# Patient Record
Sex: MALE | Race: WHITE | NOT HISPANIC OR LATINO
[De-identification: names, ages, dates, MRNs, and addresses within clinical notes are randomized per-mention and may not be internally consistent; named-entity substitution may affect disease eponyms.]

---

## 2022-05-11 PROBLEM — Z00.00 ENCOUNTER FOR PREVENTIVE HEALTH EXAMINATION: Status: ACTIVE | Noted: 2022-05-11

## 2022-05-12 ENCOUNTER — APPOINTMENT (OUTPATIENT)
Dept: DERMATOLOGY | Facility: CLINIC | Age: 77
End: 2022-05-12
Payer: MEDICARE

## 2022-05-12 DIAGNOSIS — R21 RASH AND OTHER NONSPECIFIC SKIN ERUPTION: ICD-10-CM

## 2022-05-12 PROCEDURE — 99202 OFFICE O/P NEW SF 15 MIN: CPT

## 2022-05-12 RX ORDER — METOPROLOL TARTRATE 50 MG/1
50 TABLET, FILM COATED ORAL
Refills: 0 | Status: ACTIVE | COMMUNITY

## 2022-05-12 RX ORDER — ROSUVASTATIN CALCIUM 20 MG/1
20 TABLET, FILM COATED ORAL
Refills: 0 | Status: ACTIVE | COMMUNITY

## 2022-05-12 RX ORDER — ASPIRIN ENTERIC COATED TABLETS 81 MG 81 MG/1
81 TABLET, DELAYED RELEASE ORAL
Refills: 0 | Status: ACTIVE | COMMUNITY

## 2022-05-12 RX ORDER — LISINOPRIL 10 MG/1
10 TABLET ORAL
Refills: 0 | Status: ACTIVE | COMMUNITY

## 2022-05-12 RX ORDER — SENNOSIDES 8.6 MG
TABLET ORAL
Refills: 0 | Status: ACTIVE | COMMUNITY

## 2022-05-12 NOTE — HISTORY OF PRESENT ILLNESS
[FreeTextEntry1] : Rash on arms and leg [de-identified] : First visit for this 77-year-old white male with a two-month history of asymptomatic "spots" on the arms and legs.  No previous treatment. No previous episodes.\par Patient states the spots on the arms are starting to fade away.\par \par Note: Rash appeared while patient was being treated with a three-week tapering course of methylprednisolone starting at 24 mg a day for pain in the balls of both feet.  Pain persists. Patient no longer on the methylprednisolone

## 2022-05-12 NOTE — PHYSICAL EXAM
[Alert] : alert [Oriented x 3] : ~L oriented x 3 [Well Nourished] : well nourished [FreeTextEntry3] : Type II skin\par \par Patient wearing a facemask\par \par Forearms: No rash seen\par Legs: Multiple 8-10 mm dull erythematous patches, several with peripheral desquamating borders\par \par Moderate diffuse xerosis present on both legs

## 2023-06-19 ENCOUNTER — RX ONLY (RX ONLY)
Age: 78
End: 2023-06-19

## 2023-07-12 ENCOUNTER — OFFICE (OUTPATIENT)
Facility: LOCATION | Age: 78
Setting detail: OPHTHALMOLOGY
End: 2023-07-12
Payer: MEDICARE

## 2023-07-12 DIAGNOSIS — H26.493: ICD-10-CM

## 2023-07-12 PROBLEM — H35.40 PERIPAPILLARY ATROPHY ; BOTH EYES: Status: ACTIVE | Noted: 2023-07-12

## 2023-07-12 PROBLEM — H16.223 DRY EYE SYNDROME K SICCA; BOTH EYES: Status: ACTIVE | Noted: 2023-07-12

## 2023-07-12 PROBLEM — D31.02 NEVUS,CONJUNCTIVAL; LEFT EYE: Status: ACTIVE | Noted: 2023-07-12

## 2023-07-12 PROBLEM — H18.513 ENDOTHELIAL CORNEAL DYSTROPHY; BOTH EYES: Status: ACTIVE | Noted: 2023-07-12

## 2023-07-12 PROCEDURE — 99024 POSTOP FOLLOW-UP VISIT: CPT | Performed by: OPHTHALMOLOGY

## 2023-07-12 ASSESSMENT — TONOMETRY
OD_IOP_MMHG: 14
OS_IOP_MMHG: 13

## 2023-07-12 ASSESSMENT — SUPERFICIAL PUNCTATE KERATITIS (SPK)
OS_SPK: T
OD_SPK: T

## 2023-07-12 ASSESSMENT — CORNEAL DYSTROPHY - POSTERIOR
OD_POSTERIORDYSTROPHY: GUTTATA
OS_POSTERIORDYSTROPHY: GUTTATA

## 2023-07-28 PROBLEM — Z96.1 PSEUDOPHAKIA ; BOTH EYES: Status: ACTIVE | Noted: 2023-07-12

## 2023-07-28 ASSESSMENT — VISUAL ACUITY
OD_BCVA: 20/30-2
OS_BCVA: 20/30-1

## 2023-09-13 ENCOUNTER — OFFICE (OUTPATIENT)
Facility: LOCATION | Age: 78
Setting detail: OPHTHALMOLOGY
End: 2023-09-13
Payer: MEDICARE

## 2023-09-13 DIAGNOSIS — H18.513: ICD-10-CM

## 2023-09-13 DIAGNOSIS — H16.223: ICD-10-CM

## 2023-09-13 PROCEDURE — 68761 CLOSE TEAR DUCT OPENING: CPT | Performed by: OPHTHALMOLOGY

## 2023-09-13 PROCEDURE — 99213 OFFICE O/P EST LOW 20 MIN: CPT | Performed by: OPHTHALMOLOGY

## 2023-09-13 ASSESSMENT — SUPERFICIAL PUNCTATE KERATITIS (SPK)
OS_SPK: T
OD_SPK: T

## 2023-09-13 ASSESSMENT — CONFRONTATIONAL VISUAL FIELD TEST (CVF)
OD_FINDINGS: FULL
OS_FINDINGS: FULL

## 2023-09-13 ASSESSMENT — CORNEAL DYSTROPHY - POSTERIOR
OD_POSTERIORDYSTROPHY: GUTTATA
OS_POSTERIORDYSTROPHY: GUTTATA

## 2023-09-19 ASSESSMENT — VISUAL ACUITY
OD_BCVA: 20/30-1
OS_BCVA: 20/40+2

## 2024-01-30 ENCOUNTER — OFFICE (OUTPATIENT)
Facility: LOCATION | Age: 79
Setting detail: OPHTHALMOLOGY
End: 2024-01-30
Payer: MEDICARE

## 2024-01-30 DIAGNOSIS — D31.32: ICD-10-CM

## 2024-01-30 DIAGNOSIS — H31.29: ICD-10-CM

## 2024-01-30 DIAGNOSIS — H16.223: ICD-10-CM

## 2024-01-30 DIAGNOSIS — H35.443: ICD-10-CM

## 2024-01-30 PROBLEM — H43.811 VITREOUS DETACHMENT; RIGHT EYE: Status: ACTIVE | Noted: 2024-01-30

## 2024-01-30 PROCEDURE — 92014 COMPRE OPH EXAM EST PT 1/>: CPT | Performed by: OPHTHALMOLOGY

## 2024-01-30 PROCEDURE — 92250 FUNDUS PHOTOGRAPHY W/I&R: CPT | Performed by: OPHTHALMOLOGY

## 2024-01-30 ASSESSMENT — CONFRONTATIONAL VISUAL FIELD TEST (CVF)
OS_FINDINGS: FULL
OD_FINDINGS: FULL

## 2024-01-30 ASSESSMENT — CORNEAL DYSTROPHY - POSTERIOR
OD_POSTERIORDYSTROPHY: GUTTATA
OS_POSTERIORDYSTROPHY: GUTTATA

## 2024-01-30 ASSESSMENT — SUPERFICIAL PUNCTATE KERATITIS (SPK)
OD_SPK: T
OS_SPK: T

## 2024-01-31 ASSESSMENT — REFRACTION_AUTOREFRACTION
OD_CYLINDER: +0.75
OS_AXIS: 172
OS_SPHERE: PLANO
OD_AXIS: 5
OS_CYLINDER: +2.00
OD_SPHERE: PLANO

## 2024-07-30 ENCOUNTER — OFFICE (OUTPATIENT)
Facility: LOCATION | Age: 79
Setting detail: OPHTHALMOLOGY
End: 2024-07-30
Payer: MEDICARE

## 2024-07-30 DIAGNOSIS — H43.811: ICD-10-CM

## 2024-07-30 DIAGNOSIS — H43.812: ICD-10-CM

## 2024-07-30 DIAGNOSIS — D31.32: ICD-10-CM

## 2024-07-30 DIAGNOSIS — H16.223: ICD-10-CM

## 2024-07-30 PROBLEM — H43.393 VITREOUS FLOATERS; BOTH EYES: Status: ACTIVE | Noted: 2024-07-30

## 2024-07-30 PROCEDURE — 92250 FUNDUS PHOTOGRAPHY W/I&R: CPT | Performed by: OPHTHALMOLOGY

## 2024-07-30 PROCEDURE — 92014 COMPRE OPH EXAM EST PT 1/>: CPT | Performed by: OPHTHALMOLOGY

## 2024-07-30 ASSESSMENT — CONFRONTATIONAL VISUAL FIELD TEST (CVF)
OD_FINDINGS: FULL
OS_FINDINGS: FULL

## 2024-07-31 PROBLEM — H43.812 VITREOUS DETACHMENT; RIGHT EYE, LEFT EYE: Status: ACTIVE | Noted: 2024-07-30

## 2024-07-31 PROBLEM — H43.811 VITREOUS DETACHMENT; RIGHT EYE, LEFT EYE: Status: ACTIVE | Noted: 2024-07-30

## 2025-02-03 ENCOUNTER — OFFICE (OUTPATIENT)
Facility: LOCATION | Age: 80
Setting detail: OPHTHALMOLOGY
End: 2025-02-03
Payer: MEDICARE

## 2025-02-03 DIAGNOSIS — D31.32: ICD-10-CM

## 2025-02-03 DIAGNOSIS — H43.813: ICD-10-CM

## 2025-02-03 DIAGNOSIS — H33.102: ICD-10-CM

## 2025-02-03 DIAGNOSIS — H16.223: ICD-10-CM

## 2025-02-03 PROCEDURE — 92250 FUNDUS PHOTOGRAPHY W/I&R: CPT | Performed by: OPHTHALMOLOGY

## 2025-02-03 PROCEDURE — 92014 COMPRE OPH EXAM EST PT 1/>: CPT | Performed by: OPHTHALMOLOGY

## 2025-02-03 ASSESSMENT — TONOMETRY
OD_IOP_MMHG: 15
OS_IOP_MMHG: 15

## 2025-02-03 ASSESSMENT — CORNEAL DYSTROPHY - POSTERIOR
OS_POSTERIORDYSTROPHY: GUTTATA
OD_POSTERIORDYSTROPHY: GUTTATA

## 2025-02-03 ASSESSMENT — CONFRONTATIONAL VISUAL FIELD TEST (CVF)
OD_FINDINGS: FULL
OS_FINDINGS: FULL

## 2025-02-03 ASSESSMENT — SUPERFICIAL PUNCTATE KERATITIS (SPK)
OS_SPK: T
OD_SPK: T

## 2025-02-10 ASSESSMENT — REFRACTION_AUTOREFRACTION
OD_CYLINDER: +1.25
OS_CYLINDER: +1.50
OD_SPHERE: PLANO
OS_SPHERE: PLANO
OD_AXIS: 174
OS_AXIS: 174

## 2025-02-10 ASSESSMENT — VISUAL ACUITY
OD_BCVA: 20/30-1
OS_BCVA: 20/40

## 2025-08-05 ENCOUNTER — OFFICE (OUTPATIENT)
Facility: LOCATION | Age: 80
Setting detail: OPHTHALMOLOGY
End: 2025-08-05
Payer: MEDICARE

## 2025-08-05 DIAGNOSIS — H43.813: ICD-10-CM

## 2025-08-05 DIAGNOSIS — H33.102: ICD-10-CM

## 2025-08-05 DIAGNOSIS — D31.32: ICD-10-CM

## 2025-08-05 DIAGNOSIS — H43.393: ICD-10-CM

## 2025-08-05 DIAGNOSIS — H16.223: ICD-10-CM

## 2025-08-05 PROBLEM — H01.005 BLEPHARITIS; RIGHT UPPER LID, RIGHT LOWER LID, LEFT UPPER LID, LEFT LOWER LID: Status: ACTIVE | Noted: 2025-08-05

## 2025-08-05 PROBLEM — H01.002 BLEPHARITIS; RIGHT UPPER LID, RIGHT LOWER LID, LEFT UPPER LID, LEFT LOWER LID: Status: ACTIVE | Noted: 2025-08-05

## 2025-08-05 PROBLEM — H01.001 BLEPHARITIS; RIGHT UPPER LID, RIGHT LOWER LID, LEFT UPPER LID, LEFT LOWER LID: Status: ACTIVE | Noted: 2025-08-05

## 2025-08-05 PROBLEM — H01.004 BLEPHARITIS; RIGHT UPPER LID, RIGHT LOWER LID, LEFT UPPER LID, LEFT LOWER LID: Status: ACTIVE | Noted: 2025-08-05

## 2025-08-05 PROCEDURE — 92250 FUNDUS PHOTOGRAPHY W/I&R: CPT | Performed by: OPTOMETRIST

## 2025-08-05 PROCEDURE — 92014 COMPRE OPH EXAM EST PT 1/>: CPT | Performed by: OPTOMETRIST

## 2025-08-05 ASSESSMENT — LID EXAM ASSESSMENTS
OD_BLEPHARITIS: T
OS_BLEPHARITIS: T

## 2025-08-05 ASSESSMENT — REFRACTION_AUTOREFRACTION
OS_CYLINDER: +2.00
OD_SPHERE: PLANO
OS_SPHERE: -0.25
OD_AXIS: 180
OD_CYLINDER: +1.50
OS_AXIS: 172

## 2025-08-05 ASSESSMENT — TONOMETRY
OS_IOP_MMHG: 16
OD_IOP_MMHG: 16

## 2025-08-05 ASSESSMENT — CORNEAL DYSTROPHY - POSTERIOR
OD_POSTERIORDYSTROPHY: GUTTATA
OS_POSTERIORDYSTROPHY: GUTTATA

## 2025-08-05 ASSESSMENT — VISUAL ACUITY
OS_BCVA: 20/30-2
OD_BCVA: 20/30-2

## 2025-08-05 ASSESSMENT — CONFRONTATIONAL VISUAL FIELD TEST (CVF)
OD_FINDINGS: FULL
OS_FINDINGS: FULL

## 2025-08-05 ASSESSMENT — SUPERFICIAL PUNCTATE KERATITIS (SPK)
OD_SPK: T
OS_SPK: T